# Patient Record
Sex: MALE | Race: WHITE | NOT HISPANIC OR LATINO | Employment: OTHER | URBAN - METROPOLITAN AREA
[De-identification: names, ages, dates, MRNs, and addresses within clinical notes are randomized per-mention and may not be internally consistent; named-entity substitution may affect disease eponyms.]

---

## 2023-03-22 ENCOUNTER — OFFICE VISIT (OUTPATIENT)
Dept: PODIATRY | Facility: CLINIC | Age: 73
End: 2023-03-22
Payer: MEDICARE

## 2023-03-22 ENCOUNTER — APPOINTMENT (OUTPATIENT)
Dept: RADIOLOGY | Facility: OTHER | Age: 73
End: 2023-03-22
Attending: PODIATRIST
Payer: MEDICARE

## 2023-03-22 VITALS
WEIGHT: 190 LBS | HEART RATE: 66 BPM | SYSTOLIC BLOOD PRESSURE: 121 MMHG | DIASTOLIC BLOOD PRESSURE: 77 MMHG | HEIGHT: 70 IN | BODY MASS INDEX: 27.2 KG/M2

## 2023-03-22 DIAGNOSIS — M79.672 PAIN OF LEFT HEEL: ICD-10-CM

## 2023-03-22 DIAGNOSIS — D36.10 NEUROMA: ICD-10-CM

## 2023-03-22 DIAGNOSIS — M76.62 ACHILLES TENDINITIS OF LEFT LOWER EXTREMITY: ICD-10-CM

## 2023-03-22 DIAGNOSIS — M72.2 PLANTAR FASCIITIS: ICD-10-CM

## 2023-03-22 DIAGNOSIS — M79.672 PAIN OF LEFT HEEL: Primary | ICD-10-CM

## 2023-03-22 PROCEDURE — 73630 X-RAY EXAM OF FOOT: CPT | Mod: 26,LT,, | Performed by: RADIOLOGY

## 2023-03-22 PROCEDURE — 73630 X-RAY EXAM OF FOOT: CPT | Mod: TC,LT

## 2023-03-22 PROCEDURE — 99999 PR PBB SHADOW E&M-NEW PATIENT-LVL III: ICD-10-PCS | Mod: PBBFAC,,, | Performed by: PODIATRIST

## 2023-03-22 PROCEDURE — 99203 OFFICE O/P NEW LOW 30 MIN: CPT | Mod: S$PBB,,, | Performed by: PODIATRIST

## 2023-03-22 PROCEDURE — 73630 XR FOOT COMPLETE 3 VIEW LEFT: ICD-10-PCS | Mod: 26,LT,, | Performed by: RADIOLOGY

## 2023-03-22 PROCEDURE — 99203 OFFICE O/P NEW LOW 30 MIN: CPT | Mod: PBBFAC,PN | Performed by: PODIATRIST

## 2023-03-22 PROCEDURE — 99203 PR OFFICE/OUTPT VISIT, NEW, LEVL III, 30-44 MIN: ICD-10-PCS | Mod: S$PBB,,, | Performed by: PODIATRIST

## 2023-03-22 PROCEDURE — 99999 PR PBB SHADOW E&M-NEW PATIENT-LVL III: CPT | Mod: PBBFAC,,, | Performed by: PODIATRIST

## 2023-03-22 RX ORDER — LEVOTHYROXINE SODIUM 25 UG/1
25 TABLET ORAL
COMMUNITY
Start: 2023-02-16

## 2023-03-22 RX ORDER — COVID-19 MOLECULAR TEST ASSAY
KIT MISCELLANEOUS
COMMUNITY
Start: 2023-02-17

## 2023-03-22 RX ORDER — NIRMATRELVIR AND RITONAVIR 300-100 MG
KIT ORAL
COMMUNITY
Start: 2023-02-16

## 2023-03-22 RX ORDER — MELOXICAM 7.5 MG/1
7.5 TABLET ORAL
COMMUNITY
Start: 2023-01-04

## 2023-03-22 RX ORDER — AMOXICILLIN 500 MG/1
500 TABLET, FILM COATED ORAL 3 TIMES DAILY
COMMUNITY
Start: 2022-10-06

## 2023-03-22 NOTE — PROGRESS NOTES
Chief Complaint   Patient presents with    Heel Pain           HPI:   Mich Gentile is a 72 y.o. male with concerns  of painful posterior left heel, for a few weeks, and plantar right foot, this one more chronic.   Does not recall trauma to the area.  He walks about 4 miles a day.  No self-treatment yet .  Pain is worse with prolonged walking or weight-bearing.  He usually tries to walk through the pain.      There is no problem list on file for this patient.        Current Outpatient Medications on File Prior to Visit   Medication Sig Dispense Refill    amoxicillin (AMOXIL) 500 MG Tab Take 500 mg by mouth 3 (three) times daily.      BINAXNOW COVID-19 AG SELF TEST Kit TEST AS DIRECTED TODAY      levothyroxine (SYNTHROID) 25 MCG tablet Take 25 mcg by mouth.      meloxicam (MOBIC) 7.5 MG tablet Take 7.5 mg by mouth.      PAXLOVID, EUA, 300 mg (150 mg x 2)-100 mg copackaged tablets (EUA) Take by mouth.       No current facility-administered medications on file prior to visit.           Review of patient's allergies indicates:  No Known Allergies        ROS:   General ROS: negative for - chills, fever or night sweats  Respiratory ROS: no cough, shortness of breath, or wheezing  Cardiovascular ROS: no chest pain or dyspnea on exertion  Musculoskeletal ROS: positive for - pain in foot - bilateral  negative for - joint swelling or muscle pain  Neurological ROS: no TIA or stroke symptoms  Dermatological ROS: negative        EXAM:   Vitals:    03/22/23 0941   BP: 121/77   Pulse: 66        General:  alert and oriented x 3 and in no apparent distress.     Vascular:   Dorsalis Pedis:  present     Posterior Tibial:  present  Capillary refill time:  3 seconds  Temperature of toes warm to touch  Edema: Absent bilaterally      Neurological:     Sharp touch:  normal  Light touch: normal  Tinels Sign:  Absent  Mulders Click:   Present  [place]:  intact x 5      Dermatological:   Skin: Warm and dry. no hyperpigmentation, vitiligo, or  suspicious lesions  Wounds/Ulcers:  Absent  Bruising:  Absent  Erythema:  Absent      Musculoskeletal:    Palpable pain to the achilles tendon left, tightness to the Achilles tendon with ROM left, and palpable exostosis inferior lateral aspect of calcaneus left.  Slightly higher arch foot type bilateral         3/22/2023 LEFT FOOT  XRAY:FINDINGS:  No acute fracture or dislocation seen.  Small dorsal and plantar calcaneal spurs.     No significant soft tissue edema or radiopaque retained foreign body.          ASSESSMENT/ PLAN:    Problem List Items Addressed This Visit    None  Visit Diagnoses       Pain of left heel    -  Primary    Relevant Orders    X-Ray Foot Complete Left (Completed)    ORTHOTIC DEVICE (DME)    Achilles tendinitis of left lower extremity        Relevant Orders    ORTHOTIC DEVICE (DME)    Neuroma        Plantar fasciitis        Relevant Orders    ORTHOTIC DEVICE (DME)              I counseled the patient on patient's conditions, their implications and medical management.   I ordered xrays and reviewed the xrays with the patient.  Stretching exercises.  Recommend heel lift.  Consider custom foot orthotics   prescription provided.   Voltaren gel 1% QID AS NEEDED  Call or return to clinic prn if these symptoms worsen or fail to improve as anticipated.       I spent a total of 35 minutes on the day of the visit.  This includes face to face time and non-face to face time preparing to see the patient (eg, review of tests), obtaining and/or reviewing separately obtained history, documenting clinical information in the electronic or other health record, independently interpreting results and communicating results to the patient/family/caregiver, or care coordinator.

## 2024-09-30 ENCOUNTER — NEW REFERRAL (OUTPATIENT)
Dept: URBAN - METROPOLITAN AREA CLINIC 52 | Facility: CLINIC | Age: 74
End: 2024-09-30

## 2024-09-30 VITALS — HEART RATE: 63 BPM | SYSTOLIC BLOOD PRESSURE: 155 MMHG | DIASTOLIC BLOOD PRESSURE: 93 MMHG

## 2024-09-30 DIAGNOSIS — Z98.890: ICD-10-CM

## 2024-09-30 DIAGNOSIS — H25.13: ICD-10-CM

## 2024-09-30 DIAGNOSIS — H43.813: ICD-10-CM

## 2024-09-30 DIAGNOSIS — H35.3132: ICD-10-CM

## 2024-09-30 DIAGNOSIS — H53.59: ICD-10-CM

## 2024-09-30 PROCEDURE — 99204 OFFICE O/P NEW MOD 45 MIN: CPT

## 2024-09-30 PROCEDURE — 92201 OPSCPY EXTND RTA DRAW UNI/BI: CPT

## 2024-09-30 PROCEDURE — 92134 CPTRZ OPH DX IMG PST SGM RTA: CPT

## 2024-09-30 ASSESSMENT — VISUAL ACUITY
OD_SC: 20/40
OS_SC: 20/30-
OD_SC: 20/25-2
OS_SC: 20/60

## 2024-09-30 ASSESSMENT — TONOMETRY
OS_IOP_MMHG: 08
OD_IOP_MMHG: 10

## 2025-01-26 ENCOUNTER — OFFICE VISIT (OUTPATIENT)
Dept: URGENT CARE | Facility: CLINIC | Age: 75
End: 2025-01-26
Payer: MEDICARE

## 2025-01-26 VITALS
OXYGEN SATURATION: 96 % | HEIGHT: 70 IN | HEART RATE: 66 BPM | TEMPERATURE: 98 F | WEIGHT: 190 LBS | BODY MASS INDEX: 27.2 KG/M2 | DIASTOLIC BLOOD PRESSURE: 88 MMHG | SYSTOLIC BLOOD PRESSURE: 149 MMHG | RESPIRATION RATE: 18 BRPM

## 2025-01-26 DIAGNOSIS — R05.9 COUGH, UNSPECIFIED TYPE: Primary | ICD-10-CM

## 2025-01-26 DIAGNOSIS — R09.82 POSTNASAL DRIP: ICD-10-CM

## 2025-01-26 DIAGNOSIS — H92.09 OTALGIA, UNSPECIFIED LATERALITY: ICD-10-CM

## 2025-01-26 DIAGNOSIS — I10 HYPERTENSION, UNSPECIFIED TYPE: ICD-10-CM

## 2025-01-26 DIAGNOSIS — J01.90 ACUTE NON-RECURRENT SINUSITIS, UNSPECIFIED LOCATION: ICD-10-CM

## 2025-01-26 PROCEDURE — 99214 OFFICE O/P EST MOD 30 MIN: CPT | Mod: S$GLB,,, | Performed by: FAMILY MEDICINE

## 2025-01-26 RX ORDER — AMOXICILLIN AND CLAVULANATE POTASSIUM 875; 125 MG/1; MG/1
1 TABLET, FILM COATED ORAL 2 TIMES DAILY
Qty: 14 TABLET | Refills: 0 | Status: SHIPPED | OUTPATIENT
Start: 2025-01-26 | End: 2025-02-02

## 2025-01-26 NOTE — PROGRESS NOTES
"Subjective:      Patient ID: Mich Gentile is a 74 y.o. male.    Vitals:  height is 5' 10" (1.778 m) and weight is 86.2 kg (190 lb). His oral temperature is 98.1 °F (36.7 °C). His blood pressure is 149/88 (abnormal) and his pulse is 66. His respiration is 18 and oxygen saturation is 96%.     Chief Complaint: Cough    Patient presents with c/o cough, sore throat, running nose, ear pain, sinus and chest congestion for 6 days. Pt denies fever, chills, CP, SOB, headache, weakness/dizziness, N/V, diarrhea, abdominal pain, dysuria, loss of smell or taste.  Blood pressure is noted to be moderately elevated.  Patient denies history of hypertension.      Cough  This is a new problem. Episode onset: 5 days agp. The problem has been unchanged. The problem occurs constantly. The cough is Non-productive. Associated symptoms include nasal congestion and postnasal drip. Nothing aggravates the symptoms. He has tried OTC cough suppressant for the symptoms. The treatment provided no relief.       HENT:  Positive for postnasal drip.    Respiratory:  Positive for cough.       Objective:     Physical Exam   Constitutional: He is oriented to person, place, and time. He appears well-developed. He is cooperative.  Non-toxic appearance. He does not appear ill. No distress.   HENT:   Head: Normocephalic and atraumatic.   Ears:   Right Ear: Hearing, tympanic membrane, external ear and ear canal normal. no impacted cerumen  Left Ear: Hearing, tympanic membrane, external ear and ear canal normal. no impacted cerumen  Nose: Congestion present. No mucosal edema, rhinorrhea or nasal deformity. No epistaxis. Right sinus exhibits no maxillary sinus tenderness and no frontal sinus tenderness. Left sinus exhibits no maxillary sinus tenderness and no frontal sinus tenderness.   Mouth/Throat: Uvula is midline, oropharynx is clear and moist and mucous membranes are normal. No trismus in the jaw. Normal dentition. No uvula swelling. No oropharyngeal " exudate, posterior oropharyngeal edema or posterior oropharyngeal erythema.   Eyes: Conjunctivae and lids are normal. No scleral icterus.   Neck: Trachea normal and phonation normal. Neck supple. No edema present. No erythema present. No neck rigidity present.   Cardiovascular: Normal rate, regular rhythm, normal heart sounds and normal pulses.   No murmur heard.  Pulmonary/Chest: Effort normal and breath sounds normal. No stridor. No respiratory distress. He has no decreased breath sounds. He has no wheezes. He has no rhonchi. He has no rales.   Abdominal: Normal appearance. He exhibits no distension. There is no abdominal tenderness.   Musculoskeletal: Normal range of motion.         General: No deformity. Normal range of motion.      Cervical back: He exhibits no tenderness.   Lymphadenopathy:     He has no cervical adenopathy.   Neurological: He is alert, oriented to person, place, and time and at baseline. He exhibits normal muscle tone. Coordination normal.   Skin: Skin is warm, dry, intact, not diaphoretic and not pale.   Psychiatric: His speech is normal and behavior is normal. Judgment and thought content normal.   Nursing note and vitals reviewed.      Assessment:     1. Cough, unspecified type    2. Acute non-recurrent sinusitis, unspecified location    3. Postnasal drip    4. Otalgia, unspecified laterality    5. Hypertension, unspecified type        Plan:   Discussed exam findings/results/diagnosis/plan with patient. Advised to f/u with PCP within 2-5 days. ER precautions given if symptoms get any worse. All questions answered. Patient verbally understood and agreed with treatment plan.  Educational materials and instructions regarding the visit diagnosis and management provided.     Cough, unspecified type  -     Cancel: SARS Coronavirus 2 Antigen, POCT Manual Read    Acute non-recurrent sinusitis, unspecified location    Postnasal drip    Otalgia, unspecified laterality    Hypertension, unspecified  type    Other orders  -     amoxicillin-clavulanate 875-125mg (AUGMENTIN) 875-125 mg per tablet; Take 1 tablet by mouth 2 (two) times daily. for 7 days  Dispense: 14 tablet; Refill: 0